# Patient Record
Sex: MALE | Race: WHITE | Employment: OTHER | URBAN - METROPOLITAN AREA
[De-identification: names, ages, dates, MRNs, and addresses within clinical notes are randomized per-mention and may not be internally consistent; named-entity substitution may affect disease eponyms.]

---

## 2022-12-16 ENCOUNTER — APPOINTMENT (OUTPATIENT)
Dept: GENERAL RADIOLOGY | Facility: HOSPITAL | Age: 52
End: 2022-12-16
Attending: EMERGENCY MEDICINE
Payer: COMMERCIAL

## 2022-12-16 ENCOUNTER — APPOINTMENT (OUTPATIENT)
Dept: CT IMAGING | Facility: HOSPITAL | Age: 52
End: 2022-12-16
Attending: EMERGENCY MEDICINE
Payer: COMMERCIAL

## 2022-12-16 ENCOUNTER — HOSPITAL ENCOUNTER (EMERGENCY)
Facility: HOSPITAL | Age: 52
Discharge: HOME | End: 2022-12-16
Attending: EMERGENCY MEDICINE
Payer: COMMERCIAL

## 2022-12-16 VITALS
RESPIRATION RATE: 16 BRPM | HEIGHT: 70 IN | OXYGEN SATURATION: 96 % | WEIGHT: 200 LBS | HEART RATE: 84 BPM | TEMPERATURE: 98 F | DIASTOLIC BLOOD PRESSURE: 102 MMHG | BODY MASS INDEX: 28.63 KG/M2 | SYSTOLIC BLOOD PRESSURE: 164 MMHG
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
## 2022-12-16 DIAGNOSIS — M54.2 NECK PAIN: ICD-10-CM

## 2022-12-16 DIAGNOSIS — S56.912A STRAIN OF LEFT FOREARM, INITIAL ENCOUNTER: ICD-10-CM

## 2022-12-16 PROCEDURE — 73560 X-RAY EXAM OF KNEE 1 OR 2: CPT | Performed by: EMERGENCY MEDICINE

## 2022-12-16 PROCEDURE — 99284 EMERGENCY DEPT VISIT MOD MDM: CPT

## 2022-12-16 PROCEDURE — 72125 CT NECK SPINE W/O DYE: CPT | Performed by: EMERGENCY MEDICINE

## 2022-12-16 RX ORDER — CYCLOBENZAPRINE HCL 10 MG
10 TABLET ORAL 3 TIMES DAILY PRN
Qty: 20 TABLET | Refills: 0 | Status: SHIPPED | OUTPATIENT
Start: 2022-12-16 | End: 2022-12-23

## 2022-12-16 RX ORDER — KETOROLAC TROMETHAMINE 10 MG/1
10 TABLET, FILM COATED ORAL EVERY 6 HOURS PRN
Qty: 20 TABLET | Refills: 0 | Status: SHIPPED | OUTPATIENT
Start: 2022-12-16 | End: 2022-12-23

## 2022-12-16 RX ORDER — IBUPROFEN 600 MG/1
600 TABLET ORAL ONCE
Status: COMPLETED | OUTPATIENT
Start: 2022-12-16 | End: 2022-12-16
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
## 2024-05-23 ENCOUNTER — HOSPITAL ENCOUNTER (EMERGENCY)
Facility: HOSPITAL | Age: 54
Discharge: HOME | End: 2024-05-24
Attending: EMERGENCY MEDICINE

## 2024-05-23 ENCOUNTER — APPOINTMENT (OUTPATIENT)
Dept: GENERAL RADIOLOGY | Facility: HOSPITAL | Age: 54
End: 2024-05-23
Attending: EMERGENCY MEDICINE

## 2024-05-23 VITALS
RESPIRATION RATE: 18 BRPM | WEIGHT: 245 LBS | OXYGEN SATURATION: 100 % | DIASTOLIC BLOOD PRESSURE: 69 MMHG | TEMPERATURE: 98 F | BODY MASS INDEX: 35 KG/M2 | SYSTOLIC BLOOD PRESSURE: 118 MMHG | HEART RATE: 72 BPM

## 2024-05-23 DIAGNOSIS — M79.652 LEFT THIGH PAIN: Primary | ICD-10-CM

## 2024-05-23 DIAGNOSIS — M25.562 ACUTE PAIN OF LEFT KNEE: ICD-10-CM

## 2024-05-23 DIAGNOSIS — S70.12XA TRAUMATIC ECCHYMOSIS OF LEFT THIGH, INITIAL ENCOUNTER: ICD-10-CM

## 2024-05-23 DIAGNOSIS — M25.552 LEFT HIP PAIN: ICD-10-CM

## 2024-05-23 PROCEDURE — 99284 EMERGENCY DEPT VISIT MOD MDM: CPT

## 2024-05-23 PROCEDURE — 99283 EMERGENCY DEPT VISIT LOW MDM: CPT

## 2024-05-23 RX ORDER — KETOROLAC TROMETHAMINE 30 MG/ML
30 INJECTION, SOLUTION INTRAMUSCULAR; INTRAVENOUS ONCE
Status: COMPLETED | OUTPATIENT
Start: 2024-05-23 | End: 2024-05-24

## 2024-05-24 ENCOUNTER — APPOINTMENT (OUTPATIENT)
Dept: GENERAL RADIOLOGY | Facility: HOSPITAL | Age: 54
End: 2024-05-24
Attending: EMERGENCY MEDICINE

## 2024-05-24 PROCEDURE — 96372 THER/PROPH/DIAG INJ SC/IM: CPT

## 2024-05-24 PROCEDURE — 73552 X-RAY EXAM OF FEMUR 2/>: CPT | Performed by: EMERGENCY MEDICINE

## 2024-05-24 PROCEDURE — 72170 X-RAY EXAM OF PELVIS: CPT | Performed by: EMERGENCY MEDICINE

## 2024-05-24 RX ORDER — CYCLOBENZAPRINE HCL 5 MG
5 TABLET ORAL 2 TIMES DAILY PRN
Qty: 14 TABLET | Refills: 0 | Status: SHIPPED | OUTPATIENT
Start: 2024-05-24

## 2024-05-24 RX ORDER — ACETAMINOPHEN 325 MG/1
650 TABLET ORAL EVERY 6 HOURS PRN
Qty: 30 TABLET | Refills: 0 | Status: SHIPPED | OUTPATIENT
Start: 2024-05-24

## 2024-05-24 RX ORDER — LIDOCAINE 50 MG/G
1 PATCH TOPICAL EVERY 24 HOURS
Qty: 14 PATCH | Refills: 0 | Status: SHIPPED | OUTPATIENT
Start: 2024-05-24

## 2024-05-24 RX ORDER — KETOROLAC TROMETHAMINE 10 MG/1
10 TABLET, FILM COATED ORAL EVERY 6 HOURS PRN
Qty: 28 TABLET | Refills: 0 | Status: SHIPPED | OUTPATIENT
Start: 2024-05-24 | End: 2024-05-31

## 2024-05-24 NOTE — DISCHARGE INSTRUCTIONS
Thank you for seeking care at [facility] Emergency Department.    You have been seen and evaluated.      We discussed the results of your workup   Please read the instructions provided   If given prescriptions, take as instructed    Remember, your care process does not end after your visit today. Please follow-up with your doctor within 1-2 days for a follow-up check to ensure you are  improving, to see if you need any further evaluation/testing, or to evaluate for any alternate diagnoses.     Please return to the emergency department immediately if you develop chest pain, difficulty breathing, inability to drink liquids without vomiting, one sided numbness or weakness, slurred speech, severe headache, or if you develop any other new or concerning symptoms as these could be signs of more serious medical illness.    We hope you feel better.

## 2024-05-24 NOTE — ED INITIAL ASSESSMENT (HPI)
Pt to ED via triage c/o left hip and knee pain. States he was at the gym on Saturday doing a leg press when the machine fell back causing his left leg to bend. Worsening pain and bruising to upper left leg.

## 2024-05-24 NOTE — ED PROVIDER NOTES
[facility] Emergency Department Note  Patient: Blue Vásquez Age: 54 year old Sex: male      MRN: [id number]  : 3/8/1970    Patient Seen in: [facility] Emergency Department    History     Chief Complaint   Patient presents with    Hip Pain     Stated Complaint: left leg pain, hip radiating to knee.    History obtained from: patient     Very pleasant 54-year-old history of hypertension presents to the ER with complaints of left leg pain.  Patient states that yesterday he was training with a  when he was trying to do leg presses with the left leg and the weight suddenly dropped completely on his left leg causing it to \"crunch\" up and full flexion.  He has since been having pain to the left thigh, knee and hip.  He was ambulatory afterwards but has been having increased pain today and bruising to the posterior thigh.  He denies numbness or tingling in his left lower extremity.  He denies any other injuries.  No other complaints at this time.    Review of Systems:  Review of Systems  Positive for stated complaint: left leg pain, hip radiating to knee.. Constitutional and vital signs reviewed. All other systems reviewed and negative except as noted above.    Patient History:  Past Medical History:    Essential hypertension       Past Surgical History:   Procedure Laterality Date    Heart surgery      at 3mo        No family history on file.    Specific Social Determinants of Health:   Social History     Socioeconomic History    Marital status:    Tobacco Use    Smoking status: Never    Smokeless tobacco: Never   Vaping Use    Vaping status: Never Used   Substance and Sexual Activity    Alcohol use: Never    Drug use: Never     Social Determinants of Health      Received from [facility]           PSFH elements reviewed from today and agreed except as otherwise stated in HPI.    Physical Exam     ED Triage Vitals [24 2304]   /69   Pulse 72   Resp 18   Temp 98 °F (36.7 °C)    Temp src Temporal   SpO2 100 %   O2 Device None (Room air)       Current:/69   Pulse 72   Temp 98 °F (36.7 °C) (Temporal)   Resp 18   Wt 111.1 kg   SpO2 100%   BMI 35.15 kg/m²         Physical Exam  Vitals and nursing note reviewed.   Constitutional:       General: He is not in acute distress.     Appearance: He is not ill-appearing.   HENT:      Head: Normocephalic and atraumatic.   Eyes:      Conjunctiva/sclera: Conjunctivae normal.   Cardiovascular:      Comments: 2+ dp pulses bilaterally   Musculoskeletal:         General: Tenderness present.      Right lower leg: No edema.      Left lower leg: No edema.      Comments: There is tenderness over the left patella though no other tenderness throughout the left knee joint, patient is full range of motion with flexion extension left knee joint with stable varus and valgus, negative anterior posterior drawer, patient does have scattered ecchymoses to posterior left thigh, though compartments are soft throughout the left lower extremity proximally and distally, there is tenderness to the anterior left hip though patient is able to fully flex his left hip off the bed.  Has no tenderness to the left calf, shin, ankle or foot.   Skin:     General: Skin is warm and dry.      Capillary Refill: Capillary refill takes less than 2 seconds.      Findings: Bruising present.   Neurological:      General: No focal deficit present.      Mental Status: He is alert.      Comments: Sensation tact light touch bilateral lower extremities.  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   No results found.    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    Differential includes hamstring strain/tear, knee or hip occult fracture, low suspicion for compartment syndrome, low suspicion for neurovascular injury particularly given he has 2+ symmetric pulses without other neurologic deficits    Plan: XR L hip, femur and knee, dose of toradol and lido patch for pain and will reevaluate   ED Course as of 05/24/24 0059  ------------------------------------------------------------  Time: 05/24 0054  Value: XR PELVIS (1 VIEW) (CPT=72170)  Comment: IMPRESSION:    The bony pelvic ring appears intact.  SI joints appear symmetric and normal.  Both hips appear well aligned though there is mild joint space narrowing and mild marginal spurring noted.  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  Please correlate clinically.    Dedicated views of the right femur demonstrate no acute fracture or dislocation.  Bone mineralization is normal.    ------------------------------------------------------------  Time: 05/24 0056  Comment: Patient updated with results, resting in no distress.  Updated him with incidental finding of joint narrowing that could predispose to impingement however clinically I do not feel that is what is going on today.  I counseled him again that he likely has a hamstring strain or partial tear given his ecchymosis on his left posterior thigh and he is to follow-up with orthopedic surgery as an outpatient as he may require other imaging, physical therapy.  Will discharge with anti-inflammatories any pain medicines.  Counseled him extensively on strict return precautions.  He verbalized understanding comfortable discharge  plan at this time.            Procedures:  Procedures        Disposition and Plan     Clinical Impression:  1. Left thigh pain    2. Acute pain of left knee    3. Left hip pain    4. Traumatic ecchymosis of left thigh, initial encounter        Disposition:  Discharge    Follow-up:  Behery, Omar Atef, MD  [address] [facility]  [address]  [facility] [address]  [phone]    Schedule an appointment as soon as possible for a visit in 1 week(s)      [facility] Emergency Department  [address]  [facility] [address]  [phone]  Go to  If symptoms worsen, immediately      Medications Prescribed:  Current Discharge Medication List        START taking these medications    Details   Ketorolac Tromethamine 10 MG Oral Tab Take 1 tablet (10 mg total) by mouth every 6 (six) hours as needed for Pain.  Qty: 28 tablet, Refills: 0      lidocaine 5 % External Patch Place 1 patch onto the skin daily.  Qty: 14 patch, Refills: 0      acetaminophen (TYLENOL) 325 MG Oral Tab Take 2 tablets (650 mg total) by mouth every 6 (six) hours as needed for Pain.  Qty: 30 tablet, Refills: 0      cyclobenzaprine 5 MG Oral Tab Take 1 tablet (5 mg total) by mouth 2 (two) times daily as needed.  Qty: 14 tablet, Refills: 0               This note may have been created using voice dictation technology and may include inadvertent errors.      Ny Magana DO  Attending Physician   Emergency Medicine

## 2024-12-15 ENCOUNTER — APPOINTMENT (OUTPATIENT)
Dept: GENERAL RADIOLOGY | Facility: HOSPITAL | Age: 54
End: 2024-12-15
Attending: EMERGENCY MEDICINE
Payer: MEDICAID

## 2024-12-15 ENCOUNTER — APPOINTMENT (OUTPATIENT)
Dept: CT IMAGING | Facility: HOSPITAL | Age: 54
End: 2024-12-15
Attending: EMERGENCY MEDICINE
Payer: MEDICAID

## 2024-12-15 ENCOUNTER — HOSPITAL ENCOUNTER (EMERGENCY)
Facility: HOSPITAL | Age: 54
Discharge: HOME | End: 2024-12-15
Attending: EMERGENCY MEDICINE
Payer: MEDICAID

## 2024-12-15 VITALS
BODY MASS INDEX: 37 KG/M2 | DIASTOLIC BLOOD PRESSURE: 96 MMHG | OXYGEN SATURATION: 96 % | HEART RATE: 84 BPM | SYSTOLIC BLOOD PRESSURE: 127 MMHG | RESPIRATION RATE: 29 BRPM | TEMPERATURE: 99 F | WEIGHT: 256 LBS

## 2024-12-15 DIAGNOSIS — R10.9 ABDOMINAL PAIN OF UNKNOWN ETIOLOGY: Primary | ICD-10-CM

## 2024-12-15 DIAGNOSIS — R19.7 NAUSEA VOMITING AND DIARRHEA: ICD-10-CM

## 2024-12-15 DIAGNOSIS — R11.2 NAUSEA VOMITING AND DIARRHEA: ICD-10-CM

## 2024-12-15 LAB
ALBUMIN SERPL-MCNC: 4.7 G/DL (ref 3.2–4.8)
ALBUMIN/GLOB SERPL: 1.6 {RATIO} (ref 1–2)
ALP LIVER SERPL-CCNC: 62 U/L
ALT SERPL-CCNC: 28 U/L
ANION GAP SERPL CALC-SCNC: 10 MMOL/L (ref 0–18)
AST SERPL-CCNC: 19 U/L (ref ?–34)
ATRIAL RATE: 77 BPM
BASOPHILS # BLD AUTO: 0.02 X10(3) UL (ref 0–0.2)
BASOPHILS NFR BLD AUTO: 0.1 %
BILIRUB SERPL-MCNC: 1.3 MG/DL (ref 0.3–1.2)
BILIRUB UR QL: NEGATIVE
BUN BLD-MCNC: 19 MG/DL (ref 9–23)
BUN/CREAT SERPL: 19.4 (ref 10–20)
CALCIUM BLD-MCNC: 10 MG/DL (ref 8.7–10.4)
CHLORIDE SERPL-SCNC: 106 MMOL/L (ref 98–112)
CLARITY UR: CLEAR
CO2 SERPL-SCNC: 27 MMOL/L (ref 21–32)
CREAT BLD-MCNC: 0.98 MG/DL
DEPRECATED RDW RBC AUTO: 44.7 FL (ref 35.1–46.3)
EGFRCR SERPLBLD CKD-EPI 2021: 93 ML/MIN/1.73M2 (ref 60–?)
EOSINOPHIL # BLD AUTO: 0 X10(3) UL (ref 0–0.7)
EOSINOPHIL NFR BLD AUTO: 0 %
ERYTHROCYTE [DISTWIDTH] IN BLOOD BY AUTOMATED COUNT: 12.5 % (ref 11–15)
FLUAV + FLUBV RNA SPEC NAA+PROBE: NEGATIVE
FLUAV + FLUBV RNA SPEC NAA+PROBE: NEGATIVE
GLOBULIN PLAS-MCNC: 3 G/DL (ref 2–3.5)
GLUCOSE BLD-MCNC: 173 MG/DL (ref 70–99)
GLUCOSE UR-MCNC: NORMAL MG/DL
HCT VFR BLD AUTO: 49.9 %
HGB BLD-MCNC: 16.8 G/DL
HGB UR QL STRIP.AUTO: NEGATIVE
IMM GRANULOCYTES # BLD AUTO: 0.06 X10(3) UL (ref 0–1)
IMM GRANULOCYTES NFR BLD: 0.4 %
KETONES UR-MCNC: NEGATIVE MG/DL
LEUKOCYTE ESTERASE UR QL STRIP.AUTO: NEGATIVE
LIPASE SERPL-CCNC: 25 U/L (ref 12–53)
LYMPHOCYTES # BLD AUTO: 0.24 X10(3) UL (ref 1–4)
LYMPHOCYTES NFR BLD AUTO: 1.5 %
MCH RBC QN AUTO: 32.7 PG (ref 26–34)
MCHC RBC AUTO-ENTMCNC: 33.7 G/DL (ref 31–37)
MCV RBC AUTO: 97.1 FL
MONOCYTES # BLD AUTO: 0.48 X10(3) UL (ref 0.1–1)
MONOCYTES NFR BLD AUTO: 2.9 %
NEUTROPHILS # BLD AUTO: 15.63 X10 (3) UL (ref 1.5–7.7)
NEUTROPHILS # BLD AUTO: 15.63 X10(3) UL (ref 1.5–7.7)
NEUTROPHILS NFR BLD AUTO: 95.1 %
NITRITE UR QL STRIP.AUTO: NEGATIVE
NT-PROBNP SERPL-MCNC: 82 PG/ML (ref ?–125)
OSMOLALITY SERPL CALC.SUM OF ELEC: 302 MOSM/KG (ref 275–295)
P AXIS: 51 DEGREES
P-R INTERVAL: 174 MS
PH UR: 6 [PH] (ref 5–8)
PLATELET # BLD AUTO: 241 10(3)UL (ref 150–450)
POTASSIUM SERPL-SCNC: 3.9 MMOL/L (ref 3.5–5.1)
PROT SERPL-MCNC: 7.7 G/DL (ref 5.7–8.2)
PROT UR-MCNC: 20 MG/DL
Q-T INTERVAL: 380 MS
QRS DURATION: 106 MS
QTC CALCULATION (BEZET): 430 MS
R AXIS: 74 DEGREES
RBC # BLD AUTO: 5.14 X10(6)UL
RSV RNA SPEC NAA+PROBE: NEGATIVE
SARS-COV-2 RNA RESP QL NAA+PROBE: NOT DETECTED
SODIUM SERPL-SCNC: 143 MMOL/L (ref 136–145)
SP GR UR STRIP: >1.03 (ref 1–1.03)
T AXIS: 40 DEGREES
TROPONIN I SERPL HS-MCNC: 3 NG/L
UROBILINOGEN UR STRIP-ACNC: NORMAL
VENTRICULAR RATE: 77 BPM
WBC # BLD AUTO: 16.4 X10(3) UL (ref 4–11)

## 2024-12-15 PROCEDURE — 96374 THER/PROPH/DIAG INJ IV PUSH: CPT

## 2024-12-15 PROCEDURE — 81001 URINALYSIS AUTO W/SCOPE: CPT | Performed by: EMERGENCY MEDICINE

## 2024-12-15 PROCEDURE — 83690 ASSAY OF LIPASE: CPT | Performed by: EMERGENCY MEDICINE

## 2024-12-15 PROCEDURE — 93005 ELECTROCARDIOGRAM TRACING: CPT

## 2024-12-15 PROCEDURE — 83880 ASSAY OF NATRIURETIC PEPTIDE: CPT | Performed by: EMERGENCY MEDICINE

## 2024-12-15 PROCEDURE — 99285 EMERGENCY DEPT VISIT HI MDM: CPT

## 2024-12-15 PROCEDURE — 84484 ASSAY OF TROPONIN QUANT: CPT | Performed by: EMERGENCY MEDICINE

## 2024-12-15 PROCEDURE — 83690 ASSAY OF LIPASE: CPT

## 2024-12-15 PROCEDURE — 93010 ELECTROCARDIOGRAM REPORT: CPT

## 2024-12-15 PROCEDURE — 85025 COMPLETE CBC W/AUTO DIFF WBC: CPT | Performed by: EMERGENCY MEDICINE

## 2024-12-15 PROCEDURE — 0241U SARS-COV-2/FLU A AND B/RSV BY PCR (GENEXPERT): CPT | Performed by: EMERGENCY MEDICINE

## 2024-12-15 PROCEDURE — 80053 COMPREHEN METABOLIC PANEL: CPT

## 2024-12-15 PROCEDURE — 96361 HYDRATE IV INFUSION ADD-ON: CPT

## 2024-12-15 PROCEDURE — 0241U SARS-COV-2/FLU A AND B/RSV BY PCR (GENEXPERT): CPT

## 2024-12-15 PROCEDURE — 85025 COMPLETE CBC W/AUTO DIFF WBC: CPT

## 2024-12-15 PROCEDURE — 74177 CT ABD & PELVIS W/CONTRAST: CPT | Performed by: EMERGENCY MEDICINE

## 2024-12-15 PROCEDURE — 80053 COMPREHEN METABOLIC PANEL: CPT | Performed by: EMERGENCY MEDICINE

## 2024-12-15 PROCEDURE — 71045 X-RAY EXAM CHEST 1 VIEW: CPT | Performed by: EMERGENCY MEDICINE

## 2024-12-15 RX ORDER — ONDANSETRON 4 MG/1
4 TABLET, ORALLY DISINTEGRATING ORAL EVERY 8 HOURS PRN
Qty: 10 TABLET | Refills: 0 | Status: SHIPPED | OUTPATIENT
Start: 2024-12-15 | End: 2024-12-22

## 2024-12-15 RX ORDER — ONDANSETRON 2 MG/ML
4 INJECTION INTRAMUSCULAR; INTRAVENOUS ONCE
Status: COMPLETED | OUTPATIENT
Start: 2024-12-15 | End: 2024-12-15

## 2024-12-15 NOTE — ED NOTES
Patient signed out at shift change.  Results for orders placed or performed during the hospital encounter of 12/15/24   EKG 12 Lead    Collection Time: 12/15/24 11:07 AM   Result Value Ref Range    Ventricular rate 77 BPM    Atrial rate 77 BPM    P-R Interval 174 ms    QRS Duration 106 ms    Q-T Interval 380 ms    QTC Calculation (Bezet) 430 ms    P Axis 51 degrees    R Axis 74 degrees    T Axis 40 degrees   CBC With Differential With Platelet    Collection Time: 12/15/24 12:00 PM   Result Value Ref Range    WBC 16.4 (H) 4.0 - 11.0 x10(3) uL    RBC 5.14 4.30 - 5.70 x10(6)uL    HGB 16.8 13.0 - 17.5 g/dL    HCT 49.9 39.0 - 53.0 %    MCV 97.1 80.0 - 100.0 fL    MCH 32.7 26.0 - 34.0 pg    MCHC 33.7 31.0 - 37.0 g/dL    RDW-SD 44.7 35.1 - 46.3 fL    RDW 12.5 11.0 - 15.0 %    .0 150.0 - 450.0 10(3)uL    Neutrophil Absolute Prelim 15.63 (H) 1.50 - 7.70 x10 (3) uL    Neutrophil Absolute 15.63 (H) 1.50 - 7.70 x10(3) uL    Lymphocyte Absolute 0.24 (L) 1.00 - 4.00 x10(3) uL    Monocyte Absolute 0.48 0.10 - 1.00 x10(3) uL    Eosinophil Absolute 0.00 0.00 - 0.70 x10(3) uL    Basophil Absolute 0.02 0.00 - 0.20 x10(3) uL    Immature Granulocyte Absolute 0.06 0.00 - 1.00 x10(3) uL    Neutrophil % 95.1 %    Lymphocyte % 1.5 %    Monocyte % 2.9 %    Eosinophil % 0.0 %    Basophil % 0.1 %    Immature Granulocyte % 0.4 %   Comp Metabolic Panel (14)    Collection Time: 12/15/24 12:00 PM   Result Value Ref Range    Glucose 173 (H) 70 - 99 mg/dL    Sodium 143 136 - 145 mmol/L    Potassium 3.9 3.5 - 5.1 mmol/L    Chloride 106 98 - 112 mmol/L    CO2 27.0 21.0 - 32.0 mmol/L    Anion Gap 10 0 - 18 mmol/L    BUN 19 9 - 23 mg/dL    Creatinine 0.98 0.70 - 1.30 mg/dL    BUN/CREA Ratio 19.4 10.0 - 20.0    Calcium, Total 10.0 8.7 - 10.4 mg/dL    Calculated Osmolality 302 (H) 275 - 295 mOsm/kg    eGFR-Cr 93 >=60 mL/min/1.73m2    ALT 28 10 - 49 U/L    AST 19 <34 U/L    Alkaline Phosphatase 62 45 - 117 U/L    Bilirubin, Total 1.3 (H) 0.3 - 1.2  mg/dL    Total Protein 7.7 5.7 - 8.2 g/dL    Albumin 4.7 3.2 - 4.8 g/dL    Globulin  3.0 2.0 - 3.5 g/dL    A/G Ratio 1.6 1.0 - 2.0   Lipase    Collection Time: 12/15/24 12:00 PM   Result Value Ref Range    Lipase 25 12 - 53 U/L   Troponin I (High Sensitivity)    Collection Time: 12/15/24 12:00 PM   Result Value Ref Range    Troponin I (High Sensitivity) 3 <=53 ng/L   Pro Beta Natriuretic Peptide    Collection Time: 12/15/24 12:00 PM   Result Value Ref Range    Pro-Beta Natriuretic Peptide 82 <125 pg/mL   RAINBOW DRAW BLUE    Collection Time: 12/15/24 12:00 PM   Result Value Ref Range    Hold Blue Auto Resulted    RAINBOW DRAW GOLD    Collection Time: 12/15/24 12:00 PM   Result Value Ref Range    Hold Gold Auto Resulted    SARS-CoV-2/Flu A and B/RSV by PCR (GeneXpert)    Collection Time: 12/15/24 12:00 PM    Specimen: Nares; Other   Result Value Ref Range    SARS-CoV-2 (COVID-19) - (GeneXpert) Not Detected Not Detected    Influenza A by PCR Negative Negative    Influenza B by PCR Negative Negative    RSV by PCR Negative Negative   Urinalysis with Culture Reflex    Collection Time: 12/15/24  4:16 PM    Specimen: Urine, clean catch   Result Value Ref Range    Urine Color Light-Yellow Yellow    Clarity Urine Clear Clear    Spec Gravity >1.030 (H) 1.005 - 1.030    Glucose Urine Normal Normal mg/dL    Bilirubin Urine Negative Negative    Ketones Urine Negative Negative mg/dL    Blood Urine Negative Negative    pH Urine 6.0 5.0 - 8.0    Protein Urine 20 (A) Negative mg/dL    Urobilinogen Urine Normal Normal    Nitrite Urine Negative Negative    Leukocyte Esterase Urine Negative Negative     Vitals:    12/15/24 1615   BP: (!) 127/96   Pulse: 84   Resp: (!) 29   Temp:      EKG 12 Lead        Component Value Flag Ref Range Units Status    Ventricular rate 77       BPM Final    Atrial rate 77       BPM Final    P-R Interval 174       ms Final    QRS Duration 106       ms Final    Q-T Interval 380       ms Final    QTC  Calculation (Bezet) 430       ms Final    P Axis 51       degrees Final    R Axis 74       degrees Final    T Axis 40       degrees Final                 Normal sinus rhythm  Minimal voltage criteria for LVH, may be normal variant ( [place] product )  Borderline ECG  No previous ECGs found in [facility]  Confirmed by ESTEBAN PACHECO ([id number]) on 12/15/2024 12:13:26 PM         CBC With Differential With Platelet        Component Value Flag Ref Range Units Status    WBC 16.4      4.0 - 11.0 x10(3) uL Final    RBC 5.14      4.30 - 5.70 x10(6)uL Final    HGB 16.8      13.0 - 17.5 g/dL Final    HCT 49.9      39.0 - 53.0 % Final    MCV 97.1      80.0 - 100.0 fL Final    MCH 32.7      26.0 - 34.0 pg Final    MCHC 33.7      31.0 - 37.0 g/dL Final    RDW-SD 44.7      35.1 - 46.3 fL Final    RDW 12.5      11.0 - 15.0 % Final    .0      150.0 - 450.0 10(3)uL Final    Neutrophil Absolute Prelim 15.63      1.50 - 7.70 x10 (3) uL Final    Neutrophil Absolute 15.63      1.50 - 7.70 x10(3) uL Final    Lymphocyte Absolute 0.24      1.00 - 4.00 x10(3) uL Final    Monocyte Absolute 0.48      0.10 - 1.00 x10(3) uL Final    Eosinophil Absolute 0.00      0.00 - 0.70 x10(3) uL Final    Basophil Absolute 0.02      0.00 - 0.20 x10(3) uL Final    Immature Granulocyte Absolute 0.06      0.00 - 1.00 x10(3) uL Final    Neutrophil % 95.1       % Final    Lymphocyte % 1.5       % Final    Monocyte % 2.9       % Final    Eosinophil % 0.0       % Final    Basophil % 0.1       % Final    Immature Granulocyte % 0.4       % Final                  Comp Metabolic Panel (14)        Component Value Flag Ref Range Units Status    Glucose 173      70 - 99 mg/dL Final    Sodium 143      136 - 145 mmol/L Final    Potassium 3.9      3.5 - 5.1 mmol/L Final    Chloride 106      98 - 112 mmol/L Final    CO2 27.0      21.0 - 32.0 mmol/L Final    Anion Gap 10      0 - 18 mmol/L Final    BUN 19      9 - 23 mg/dL Final    Creatinine 0.98      0.70 - 1.30 mg/dL Final     BUN/CREA Ratio 19.4      10.0 - 20.0  Final    Calcium, Total 10.0      8.7 - 10.4 mg/dL Final    Calculated Osmolality 302      275 - 295 mOsm/kg Final    eGFR-Cr 93      >=60 mL/min/1.73m2 Final    ALT 28      10 - 49 U/L Final    AST 19      <34 U/L Final    Alkaline Phosphatase 62      45 - 117 U/L Final    Bilirubin, Total 1.3      0.3 - 1.2 mg/dL Final    Total Protein 7.7      5.7 - 8.2 g/dL Final    Albumin 4.7      3.2 - 4.8 g/dL Final    Globulin  3.0      2.0 - 3.5 g/dL Final    A/G Ratio 1.6      1.0 - 2.0  Final                  Lipase        Component Value Flag Ref Range Units Status    Lipase 25      12 - 53 U/L Final                  RAINBOW DRAW BLUE        Component    Hold Blue    Auto Resulted                    RAINBOW DRAW GOLD        Component    Hold Gold    Auto Resulted                    SARS-CoV-2/Flu A and B/RSV by PCR (GeneXpert)        Specimen Information: Nares; Other      Component Value Flag Ref Range Units Status    SARS-CoV-2 (COVID-19) - (GeneXpert) Not Detected      Not Detected  Final    Influenza A by PCR Negative      Negative  Final    Influenza B by PCR Negative      Negative  Final    RSV by PCR Negative      Negative  Final                  Troponin I (High Sensitivity)        Component Value Flag Ref Range Units Status    Troponin I (High Sensitivity) 3      <=53 ng/L Final                  XR CHEST AP PORTABLE  (CPT=71045)          PROCEDURE: XR CHEST AP PORTABLE  (CPT=71045)  TIME: 1239.       COMPARISON: None.     INDICATIONS: Nausea/vomiting and diarrhea x today.     TECHNIQUE:   Single view.       FINDINGS:      The heart and mediastinal structures are minimally enlarged.  Pulmonary vascularity is slightly increased.  The findings are suggestive of slight fluid overload.     Lung volumes are minimally diminished with minimal bibasilar atelectasis.              =====  CONCLUSION: Slight cardiac enlargement with secondary signs of minimal fluid overload            Dictated by (CST): Sandoval Mai MD on 12/15/2024 at 12:56 PM       Finalized by (CST): Sandoval Mai MD on 12/15/2024 at 12:56 PM                 CT ABDOMEN+PELVIS(CONTRAST ONLY)(CPT=74177)          PROCEDURE: CT ABDOMEN + PELVIS (CONTRAST ONLY) (CPT=74177)     COMPARISON: None.     INDICATIONS: NVD     TECHNIQUE: CT images of the abdomen and pelvis were obtained with non-ionic intravenous contrast material.  Automated exposure control for dose reduction was used. Adjustment of the mA and/or kV was done based on the patient's size. Use of iterative   reconstruction technique for dose reduction was used.  Dose information is transmitted to the ACR (American College of Radiology) NRDR (National Radiology Data Registry) which includes the Dose Index Registry.     FINDINGS:   LIVER: No enlargement, atrophy, abnormal density, or significant focal lesion.    GALLBLADDER: Normal size and appearance.  BILIARY: No visible dilatation or calcification.    SPLEEN: No enlargement or focal lesion.    STOMACH: No gross gastric mass, obstruction or focal abnormality.  Duodenum unremarkable.   PANCREAS: No lesion, fluid collection, ductal dilatation, or atrophy.    ADRENALS: No defined mass or abnormal enlargement.    KIDNEYS: Normal.  No mass, obstruction or calcification.    AORTA/VASCULAR:   Normal.  No aneurysm or dissection.   RETROPERITONEUM: No mass or enlarged adenopathy.    BOWEL/MESENTERY:  Normal.  No visible mass, obstruction, or bowel wall thickening.  Appendix .   ABDOMINAL WALL: Normal.  No mass or hernia.   URINARY BLADDER: No visible focal wall thickening, lesion or calculus.    PELVIC NODES: No enlarged mass or adenopathy.     PELVIC ORGANS: No visible mass.  Pelvic organs appropriate for patient age.    BONES:   No significant bony lesion or fracture.  LUNG BASES: No visible pulmonary or pleural disease.   OTHER: Negative.       CONCLUSION:             Dictated by (CST): Sandoval Mai MD on 12/15/2024  at 2:52 PM       Finalized by (CST): Sandoval Mai MD on 12/15/2024 at 2:54 PM                          Pro Beta Natriuretic Peptide        Component Value Flag Ref Range Units Status    Pro-Beta Natriuretic Peptide 82      <125 pg/mL Final    Comment:    This test may exhibit interference when a sample is collected from a person who is consuming high dose of biotin (a.k.a., vitamin B7, vitamin H, coenzyme R) supplements resulting in serum concentrations >100 ng/mL.  Intake of the recommended daily allowance (RDA) for biotin (0.03 mg) has not been shown to typically cause significant interference; however, high dose daily dietary supplements may contain biotin concentrations greater than 150 times (5-10 mg) the RDA.  It is recommended that physicians ask all patients who may be on biotin supplementation to stop biotin consumption at least 72 hours prior to collection of a new sample.                    Urinalysis with Culture Reflex        Specimen Information: Urine, clean catch      Component Value Flag Ref Range Units Status    Urine Color Light-Yellow      Yellow  Final    Clarity Urine Clear      Clear  Final    Spec Gravity >1.030      1.005 - 1.030  Final    Glucose Urine Normal      Normal mg/dL Final    Bilirubin Urine Negative      Negative  Final    Ketones Urine Negative      Negative mg/dL Final    Blood Urine Negative      Negative  Final    pH Urine 6.0      5.0 - 8.0  Final    Protein Urine 20      Negative mg/dL Final    Urobilinogen Urine Normal      Normal  Final    Nitrite Urine Negative      Negative  Final    Leukocyte Esterase Urine Negative      Negative  Final                          54-year-old male who presents to the emergency department for lower abdominal pain.  I was asked to follow on urinalysis results and BNP and to discharge thereafter.  Patient reports having lower abdominal pain for 2 days.  CT abdomen negative for appendicitis.  I personally spoke to reading radiologist  about his negative imaging study    UA does not appear consistent with UTI.  BNP normal.  Patient has no chest pain, shortness of breath.  Tolerating p.o. here in the emergency department.    No testicular pain.  Torsion, Cholecystitis, AAA, dissection, pancreatitis, mesenteric ischemia, volvulus, bowel obstruction, appendicitis, among other life-threatening medical conditions considered and seems unlikely given patient's history, exam, and appearance.  Strict return instructions given.  Patient encouraged to follow-up with primary care provider in the next few days.  Advised to return to the emergency department for any worsening symptoms    Patient is non toxic appearing, is in no distress, hemodynamically stable.  Pt agrees and is aware of plan.   Patient is comfortable with discharge    .

## 2024-12-15 NOTE — DISCHARGE INSTRUCTIONS
Drink plenty of fluids. Clear liquid diet today. Advance as tolerated. F/U with PMD for recheck in 2-3 days.  
Opt out

## 2024-12-15 NOTE — ED PROVIDER NOTES
Patient Seen in: [facility] Emergency Department      History     Chief Complaint   Patient presents with    Abdomen/Flank Pain    Vomiting     Stated Complaint: NVD    Subjective:   HPI      Patient events the emergency department with abdominal pain vomiting and diarrhea.  He states that this morning at about 4 AM he began having some vomiting and diarrhea.  He also had some chest discomfort.  There is no cough or cold symptoms.  There is no fever.  He has no history of abdominal surgery.  He had a heart surgery done at 3 months of age for \"hole in the heart\".    Objective:     Past Medical History:    Essential hypertension              Past Surgical History:   Procedure Laterality Date    Heart surgery      at 3mo                Social History     Socioeconomic History    Marital status:    Tobacco Use    Smoking status: Never    Smokeless tobacco: Never   Vaping Use    Vaping status: Never Used   Substance and Sexual Activity    Alcohol use: Never    Drug use: Never     Social Drivers of Health      Received from [facility]    [facility]                  Physical Exam     ED Triage Vitals [12/15/24 1100]   /76   Pulse 75   Resp 18   Temp 98.8 °F (37.1 °C)   Temp src Temporal   SpO2 93 %   O2 Device None (Room air)       Current Vitals:   Vital Signs  BP: (!) 127/96  Pulse: 84  Resp: (!) 29  Temp: 98.8 °F (37.1 °C)  Temp src: Temporal  MAP (mmHg): (!) 105    Oxygen Therapy  SpO2: 96 %  O2 Device: None (Room air)        Physical Exam  Vitals and nursing note reviewed.   Constitutional:       General: He is not in acute distress.     Appearance: He is well-developed.   HENT:      Head: Normocephalic.      Nose: Nose normal.      Mouth/Throat:      Mouth: Mucous membranes are moist.   Eyes:      Conjunctiva/sclera: Conjunctivae normal.   Cardiovascular:      Rate and Rhythm: Normal rate and regular rhythm.      Heart sounds: No murmur heard.  Pulmonary:      Effort: Pulmonary effort is  normal. No respiratory distress.      Breath sounds: Normal breath sounds.   Abdominal:      General: There is no distension.      Palpations: Abdomen is soft.      Tenderness: There is generalized abdominal tenderness. There is no guarding or rebound.   Musculoskeletal:         General: No tenderness. Normal range of motion.      Cervical back: Normal range of motion and neck supple.   Skin:     General: Skin is warm and dry.      Capillary Refill: Capillary refill takes less than 2 seconds.      Findings: No rash.   Neurological:      General: No focal deficit present.      Mental Status: He is alert and oriented to person, place, and time.             ED Course     Labs Reviewed   CBC WITH DIFFERENTIAL WITH PLATELET - Abnormal; Notable for the following components:       Result Value    WBC 16.4 (*)     Neutrophil Absolute Prelim 15.63 (*)     Neutrophil Absolute 15.63 (*)     Lymphocyte Absolute 0.24 (*)     All other components within normal limits   COMP METABOLIC PANEL (14) - Abnormal; Notable for the following components:    Glucose 173 (*)     Calculated Osmolality 302 (*)     Bilirubin, Total 1.3 (*)     All other components within normal limits   URINALYSIS WITH CULTURE REFLEX - Abnormal; Notable for the following components:    Spec Gravity >1.030 (*)     Protein Urine 20 (*)     All other components within normal limits   LIPASE - Normal   TROPONIN I HIGH SENSITIVITY - Normal   PRO BETA NATRIURETIC PEPTIDE - Normal   SARS-COV-2/FLU A AND B/RSV BY PCR (GENEXPERT) - Normal    Narrative:     This test is intended for the qualitative detection and differentiation of SARS-CoV-2, influenza A, influenza B, and respiratory syncytial virus (RSV) viral RNA in nasopharyngeal or nares swabs from individuals suspected of respiratory viral infection consistent with COVID-19 by their healthcare provider. Signs and symptoms of respiratory viral infection due to SARS-CoV-2, influenza, and RSV can be similar.    Test  performed using the Xpert Xpress SARS-CoV-2/FLU/RSV (real time RT-PCR)  assay on the GeneXpert instrument, [id number], [place] [address].   This test is being used under the Food and Drug Administration's Emergency Use Authorization.    The authorized Fact Sheet for Healthcare Providers for this assay is available upon request from the laboratory.   RAINBOW DRAW BLUE   RAINBOW DRAW GOLD     EKG    Rate, intervals and axes as noted on EKG Report.  Rate: 77 bpm   Rhythm: Sinus Rhythm  Reading: LVH with strain, abnormal                       MDM              Medical Decision Making  Differential diagnosis considered for gastroenteritis, cholecystitis, appendicitis, diverticulitis.    Problems Addressed:  Abdominal pain of unknown etiology: acute illness or injury  Nausea vomiting and diarrhea: acute illness or injury    Amount and/or Complexity of Data Reviewed  Labs: ordered.     Details: Elevated white blood cell count.  Rest of electrolytes and laboratory studies unremarkable  Radiology: ordered and independent interpretation performed. Decision-making details documented in ED Course.     Details: CT scan shows no acute intra-abdominal pathology  ECG/medicine tests: ordered and independent interpretation performed. Decision-making details documented in ED Course.  Discussion of management or test interpretation with external provider(s): I suspect this patient has a viral enteritis.  He was given fluids and antinausea medication and feels much better here drinking liquids without difficulty.  No pain on reexamination.  Will discharge with follow-up with primary care physician.    Risk  Prescription drug management.  Parenteral controlled substances.        Disposition and Plan     Clinical Impression:  1. Abdominal pain of unknown etiology    2. Nausea vomiting and diarrhea         Disposition:  Discharge  12/15/2024  4:51 pm    Follow-up:  [place] [id number] [address] [facility]  [id number]  [phone]  Schedule an appointment as soon as possible for a visit            Medications Prescribed:  Discharge Medication List as of 12/15/2024  5:20 PM        START taking these medications    Details   ondansetron 4 MG Oral Tablet Dispersible Take 1 tablet (4 mg total) by mouth every 8 (eight) hours as needed., Normal, Disp-10 tablet, R-0                 Supplementary Documentation: